# Patient Record
Sex: MALE | Race: WHITE
[De-identification: names, ages, dates, MRNs, and addresses within clinical notes are randomized per-mention and may not be internally consistent; named-entity substitution may affect disease eponyms.]

---

## 2017-05-27 ENCOUNTER — HOSPITAL ENCOUNTER (EMERGENCY)
Dept: HOSPITAL 25 - UCCORT | Age: 52
Discharge: HOME | End: 2017-05-27
Payer: MEDICARE

## 2017-05-27 VITALS — DIASTOLIC BLOOD PRESSURE: 73 MMHG | SYSTOLIC BLOOD PRESSURE: 137 MMHG
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
## 2017-05-27 DIAGNOSIS — S90.31XA: Primary | ICD-10-CM
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
## 2017-05-27 PROCEDURE — 99211 OFF/OP EST MAY X REQ PHY/QHP: CPT

## 2017-05-27 PROCEDURE — G0463 HOSPITAL OUTPT CLINIC VISIT: HCPCS
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
## 2017-05-27 NOTE — RAD
HISTORY: First and second metatarsal pain of the right foot, trauma



COMPARISONS: None



VIEWS: 3, Frontal, lateral, and oblique views of the right foot



FINDINGS:



BONE DENSITY: Normal.

BONES: There is no displaced fracture.    

JOINTS: There is no arthropathy.    

ALIGNMENT: There is no dislocation. 

SOFT TISSUES: Unremarkable.



OTHER FINDINGS: None.



IMPRESSION: 

NO ACUTE OSSEOUS INJURY. IF SYMPTOMS PERSIST, RECOMMEND REPEAT IMAGING.

## 2017-06-02 ENCOUNTER — HOSPITAL ENCOUNTER (EMERGENCY)
Dept: HOSPITAL 25 - UCCORT | Age: 52
Discharge: HOME | End: 2017-06-02
Payer: MEDICARE

## 2017-06-02 VITALS — SYSTOLIC BLOOD PRESSURE: 122 MMHG | DIASTOLIC BLOOD PRESSURE: 83 MMHG

## 2017-06-02 DIAGNOSIS — S40.021A: ICD-10-CM

## 2017-06-02 DIAGNOSIS — K21.9: ICD-10-CM

## 2017-06-02 DIAGNOSIS — F32.9: ICD-10-CM
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
## 2017-06-02 DIAGNOSIS — I10: ICD-10-CM
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
## 2017-06-02 DIAGNOSIS — E03.9: ICD-10-CM
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
## 2017-06-02 DIAGNOSIS — S29.8XXA: ICD-10-CM

## 2017-06-02 DIAGNOSIS — M25.511: Primary | ICD-10-CM

## 2017-06-02 PROCEDURE — 71020: CPT

## 2017-06-02 PROCEDURE — G0463 HOSPITAL OUTPT CLINIC VISIT: HCPCS

## 2017-06-02 PROCEDURE — 99212 OFFICE O/P EST SF 10 MIN: CPT
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
## 2017-06-02 NOTE — RAD
INDICATION:  Right rib cage pain.



TECHNIQUE:  3 views of the right ribs were obtained.



FINDINGS:  There is a faint radiolucent line extending through the inferior aspect of the

posterior lateral right sixth rib possibly representing a nondisplaced fracture. This is

only seen in one view. No other fractures are seen.



IMPRESSION:  POSSIBLE NONDISPLACED FRACTURE OF THE RIGHT POSTEROLATERAL SIXTH RIB.

## 2017-06-02 NOTE — RAD
INDICATION:  Right shoulder pain.



TECHNIQUE: 3 views of the right shoulder were obtained.



FINDINGS:  The bones are in normal alignment. No fracture is seen.  



There is mild osteoarthritic change in the glenohumeral joint.



IMPRESSION:  MILD OSTEOARTHRITIC CHANGE.

## 2017-06-02 NOTE — RAD
INDICATION:  Right rib cage pain.



COMPARISON:  Comparison is made with a prior chest x-ray study from July 22, 2013.



TECHNIQUE: Dual-energy PA  and lateral views of the chest were obtained.



FINDINGS:   The heart is within normal limits in size. Mediastinal and hilar contours

appear within normal limits.



The lungs are underinflated. There is minimal atelectasis at the left lung base. The lungs

are otherwise clear. No pleural effusion or pneumothorax is seen.



There is mild pleural thickening present laterally within the right hemithorax possibly

from a rib fracture. Consider rib detail films for further evaluation. 



IMPRESSION:  MILD RIGHT PLEURAL THICKENING LATERALLY POSSIBLY FROM RIB A FRACTURE CONSIDER

RIB FILMS FOR FURTHER EVALUATION.

## 2017-07-29 ENCOUNTER — HOSPITAL ENCOUNTER (EMERGENCY)
Dept: HOSPITAL 25 - UCCORT | Age: 52
Discharge: HOME | End: 2017-07-29
Payer: MEDICARE

## 2017-07-29 VITALS — DIASTOLIC BLOOD PRESSURE: 75 MMHG | SYSTOLIC BLOOD PRESSURE: 114 MMHG

## 2017-07-29 DIAGNOSIS — F79: ICD-10-CM

## 2017-07-29 DIAGNOSIS — Z04.1: Primary | ICD-10-CM

## 2017-07-29 PROCEDURE — G0463 HOSPITAL OUTPT CLINIC VISIT: HCPCS

## 2017-07-29 PROCEDURE — 99211 OFF/OP EST MAY X REQ PHY/QHP: CPT
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
## 2020-03-03 ENCOUNTER — HOSPITAL ENCOUNTER (EMERGENCY)
Dept: HOSPITAL 25 - UCCORT | Age: 55
Discharge: HOME | End: 2020-03-03
Payer: MEDICARE

## 2020-03-03 VITALS — DIASTOLIC BLOOD PRESSURE: 86 MMHG | SYSTOLIC BLOOD PRESSURE: 154 MMHG
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
## 2020-03-03 DIAGNOSIS — Z99.3: ICD-10-CM

## 2020-03-03 DIAGNOSIS — M81.0: ICD-10-CM

## 2020-03-03 DIAGNOSIS — S20.229A: Primary | ICD-10-CM

## 2020-03-03 PROCEDURE — 99211 OFF/OP EST MAY X REQ PHY/QHP: CPT

## 2020-03-03 PROCEDURE — G0463 HOSPITAL OUTPT CLINIC VISIT: HCPCS

## 2020-03-03 NOTE — XMS REPORT
Continuity of Care Document (CCD)

 Created on:2020



Patient:Gigi Summers

Sex:Male

:1965

External Reference #:[id number]





Demographics







 Address  [address] [place] [address]

 

 Home Phone  [phone]

 

 Preferred Language  en

 

 Marital Status  Not  or 

 

 Baptism Affiliation  Unknown

 

 Race  White

 

 Ethnic Group  Not  or 









Author







 Name  Marlin Pearson, [facility]

 

 Address  [address] [place] [id number]









Care Team Providers







 Name  Role  Phone

 

 Heber Proctor MD - Family  Care Team Information   [phone]
[phone]



 Medicine    









Problems







 Active Problems  Provider  Date

 

 Screening for malignant neoplasm of  Stacy Cooney M.D.  Onset: 2018



 prostate    

 

 Chest pain  Tino Holland M.D.,  Onset: 2015



   [facility]  

 

 Electrocardiogram abnormal  Tino Holland M.D.,  Onset: 2015



   [facility]  







Social History







 Type  Date  Description  Comments

 

 Birth Sex    Unknown  

 

 Tobacco Use  Start: Unknown  Never Smoked Cigarettes  

 

 ETOH Use    Never used alcohol  

 

 Tobacco Use  Start: Unknown End: Unknown  Patient is a former smoker  

 

 Recreational Drug Use    Denies Drug Use  

 

 Smoking Status  Reviewed: 18  Patient is a former smoker  







Allergies, Adverse Reactions, Alerts







 Active Allergies  Reaction  Severity  Comments  Date

 

 Meningococcal Vaccines  VOMITTING AND FEVER      2018









 Inactive Allergies









 NKDA        2015







Medications







 Active Medications  SIG  Qnty  Indications  Ordering Provider  Date

 

 Amiloride HCL  1 by mouth  90tabs    Unknown  



            5mg Tablets  every day        



           

 

 Omeprazole  2 Tabs Via Peg  30caps    Unknown  



         20mg Capsules  bid        



 DR          

 

 Zyprexa  1 by mouth  30tabs    Unknown  



      15mg Tablets  every night        



           

 

 Sertraline HCL  1 by mouth  30tabs    Unknown  



             100mg  every day        



 Tablets          



           

 

 Divalproex Sodium  2 cap po tid      Unknown  



                125mg  Via Gtube        



 Capsule          



           

 

 Synthroid  1 by mouth      Unknown  



        112mcg Tablets  every day vi aG        



   Tube        

 

 Oscal 500/200 D-3  1 tab by mouth      Unknown  



   tid  a day        



 500-400mg-Unit Tablets          



           

 

 Vitamin D3 Super  1 by mouth      Unknown  



 Strength  every day        



       2000Unit          



 Capsules          



           







Immunizations







 Description

 

 No Information Available







Vital Signs







 Date  Vital  Result  Comment

 

 2019  1:14pm  BP Systolic  112 mmHg  









 BP Diastolic  82 mmHg  

 

 Body Temperature  96.0 F  

 

 Heart Rate  66 /min  

 

 Height  66 inches  5'6"

 

 Weight  149.00 lb  

 

 BMI (Body Mass Index)  24.0 kg/m2  

 

 BSA (Body Surface Area)  1.76 m2  

 

 Ideal body weight in kilograms  64 kg  

 

 O2 % BldC Oximetry  97 %  









 2018 11:16am  BP Systolic  110 mmHg  









 BP Diastolic  69 mmHg  

 

 Body Temperature  97.3 F  

 

 Heart Rate  68 /min  

 

 Respiratory Rate  16 /min  

 

 Height  68 inches  5'8"

 

 Weight  145.00 lb  Per paper work/ pt present in wheel chair

 

 Pain Level  0  

 

 BMI (Body Mass Index)  22.0 kg/m2  

 

 BSA (Body Surface Area)  1.78 m2  

 

 Ideal body weight in kilograms  70 kg  

 

 O2 % BldC Oximetry  93 %  







Results







 Test  Acquired Date  Facility  Test  Result  H/L  Range  Note

 

 Basic Metabolic  2019  [facility]  Glucose  120 mg/dL  High    1



 Panel    [address] [place], [place] [id number] [phone]          









 BUN  27 mg/dL  High  7-18  

 

 Creatinine  1.0 mg/dL  Normal  0.6-1.3  

 

 Glom Filtration Rate, Estimate  >60 mL/min    >60  

 

 If African American  >60 mL/min    >60  2

 

 BUN/Creat  27.0 ratio      

 

 Sodium  152 mmol/L  High  136-145  

 

 Potassium  3.9 mmol/L  Normal  3.5-5.1  

 

 Chloride  121 mmol/L  Critical high    

 

 Carbon Dioxide  25 mmol/L  Normal  21-32  

 

 Anion Gap  6 mEq/L  Low  8-16  

 

 Calcium  9.3 mg/dL  Normal  8.5-10.1  









 Basic Metabolic Panel  2019  [facility]  Glucose  110 mg/dL  High    



     [address] [place], [place] [id number] [phone]          









 BUN  25 mg/dL  High  7-18  

 

 Creatinine  1.0 mg/dL  Normal  0.6-1.3  

 

 Glom Filtration Rate, Estimate  >60 mL/min    >60  

 

 If African American  >60 mL/min    >60  3

 

 BUN/Creat  25.0 ratio      

 

 Sodium  151 mmol/L  High  136-145  

 

 Potassium  4.0 mmol/L  Normal  3.5-5.1  

 

 Chloride  121 mmol/L  Critical high    

 

 Carbon Dioxide  24 mmol/L  Normal  21-32  

 

 Anion Gap  6 mEq/L  Low  8-16  

 

 Calcium  9.0 mg/dL  Normal  8.5-10.1  









 CBC W/Automated  2019  [facility]  White Blood  9.1 K/uL  Normal  3.4-10.5  



 Diff    [address]  Count        



     [place] [address] [phone]          









 Red Blood Count  3.29 M/uL  Low  4.20-5.80  

 

 Hemoglobin  11.9 gm/dL  Low  12.8-17.0  

 

 Hematocrit  34.5 %  Low  38.0-48.0  

 

 Mean Cell Volume  104.9 fl  High  80.0-96.0  

 

 Mean Corpuscular HGB  36.2 pg  High  27.0-33.0  

 

 Mean Corpuscular HGB Conc  34.5 g/dL  Normal  31.7-36.0  

 

 Platelet Count  67 K/uL  Low  155-360  

 

 Red Cell Distri Width SD  49.6 fl  Normal  36-51  

 

 Red Cell Distri Width %CV  13.0 %  Normal  11.6-15.8  

 

 Mean Platelet Volume  13.7 fl  High  6.6-10.6  

 

 Neut%  83.6 %  High  33.0-73.0  

 

 Lymph %  9.4 %  Low  20.0-42.0  

 

 Mono %  6.3 %  Normal  0.0-10.0  

 

 Eo%  0.0 %  Normal  0.0-6.6  

 

 Bas%  0.2 %  Normal  0.0-1.1  

 

 Immature Grans  0.5 %  Normal  0.0-5.0  

 

 NRBC %  0.0 /100WBC    < 10/ 100 WBC  

 

 Neut#  7.61 K/uL  High  1.8-7.0  

 

 Lymph #  0.86 K/uL  Low  1.0-4.0  

 

 Mono #  0.57 K/uL  Normal  0.0-0.8  

 

 Eos #  0.00 K/uL  Normal  0.0-0.5  

 

 Baso #  0.02 K/uL  Normal  0.0-0.1  

 

 Immature Grans Absolute  0.05 K/uL      

 

 NRBC #  0.00 K/uL      









 Lactic Acid  2019  [facility]  Lactic Acid  1.4 mmol/L  Normal  0.4-1.9  



     [address]          



     [place] [address] [phone]          









 Lab Reflex >2.0 for Sepsis?  N      









 Basic Metabolic Panel  2019  [facility]  Glucose  108 mg/dL  High    



     [address] [place], [place] [id number] [phone]          









 BUN  25 mg/dL  High  7-18  

 

 Creatinine  0.9 mg/dL  Normal  0.6-1.3  

 

 Glom Filtration Rate, Estimate  >60 mL/min    >60  

 

 If African American  >60 mL/min    >60  4

 

 BUN/Creat  27.7 ratio      

 

 Sodium  151 mmol/L  High  136-145  

 

 Potassium  4.4 mmol/L    3.5-5.1  

 

 Chloride  122 mmol/L  Critical high    

 

 Carbon Dioxide  25 mmol/L  Normal  21-32  

 

 Anion Gap  4 mEq/L  Low  8-16  

 

 Calcium  8.3 mg/dL  Low  8.5-10.1  









 Lactic Acid  2019  [facility]  Lactic Acid  2.7 mmol/L  Critical  0.4-1.9  



     [address] [place] [address] [phone]          









 Lab Reflex >2.0 for Sepsis?  N      









 Lactic Acid  2019  [facility]  Lactic Acid  2.9 mmol/L  Critical  0.4-1.9  



     [address] [place] [address] [phone]          









 Lab Reflex >2.0 for Sepsis?  Y      









 Laboratory  2019  [facility]  Lactic  2.5 mmol/L  Critical  0.4-1.9  



 test finding    [address] [place] [address] [phone]          

 

 Ua RFX Micro &  2019  [facility]  Urine  YELLOW    Yellow  



 Culture II    [address] [place] [address] [phone]          









 Urine Clarity  CLEAR    Clear  

 

 Urine Glucose - Dipstick  NEGATIVE mg/dL    Negative  

 

 Urine Bilirubin - Dipstick  NEGATIVE    Negative  

 

 Urine Ketone  NEGATIVE mg/dL    Negative  

 

 Urine Specific Gravity  1.010  Normal  1.010-1.030  

 

 Urine Blood  NEGATIVE    0-2  

 

 Urine PH  7.5  Normal  6.5-7.5  

 

 Urine Protein - Dipstick  NEGATIVE mg/dL    Negative  

 

 Urine Urobilinogen - Dipstick  0.2 E.U./dL  Normal  0.2-1.0  

 

 Urine Nitrite - Dipstick  NEGATIVE    Negative  

 

 Urine Leuk Esterase  NEGATIVE    Negative  

 

 Source:  URINE, CLEAN CAT <SEE NOTE>      5









 Comprehensive  2019  [facility]  Glucose  86 mg/dL  Normal    



 Metabolic Panel    [address]          



     [place] [address] [phone]          









 BUN  36 mg/dL  High  7-18  

 

 Creatinine  1.2 mg/dL  Normal  0.6-1.3  

 

 Glom Filtration Rate, Estimate  >60 mL/min    >60  

 

 If African American  >60 mL/min    >60  6

 

 BUN/Creat  30.0 ratio      

 

 Sodium  141 mmol/L  Normal  136-145  

 

 Potassium  3.6 mmol/L  Normal  3.5-5.1  

 

 Chloride  108 mmol/L  High    

 

 Carbon Dioxide  24 mmol/L  Normal  21-32  

 

 Anion Gap  9 mEq/L  Normal  8-16  

 

 Calcium  9.1 mg/dL  Normal  8.5-10.1  

 

 Total Protein  6.6 g/dL  Normal  6.4-8.2  

 

 Albumin  2.9 g/dL  Low  3.4-5.0  

 

 Globulin  3.7 g/dL  Normal  1.9-4.3  

 

 Alb/Glob  0.8 ratio      

 

 Bilirubin,Total  0.9 mg/dL  Normal  0.2-1.0  

 

 Sgot/Ast  29 U/L  Normal  15-37  

 

 SGPT/Alt  37 U/L  Normal  12-78  

 

 Alkaline Phosphatase  121 U/L  High    









 Blood Culture  2019  [facility]  Blood Culture  NO GROWTH: FINAL      7



     [address]  Aerobic  <SEE NOTE>      



     [place] [address] [phone]          









 Blood Culture Anaerobic  NO GROWTH: FINAL <SEE NOTE>      8









 Blood Culture  2019  [facility]  Blood Culture  NO GROWTH: FINAL      9



     [address]  Aerobic  <SEE NOTE>      



     [place] [address] [phone]          









 Blood Culture Anaerobic  NO GROWTH: FINAL <SEE NOTE>      10









 1  SEPSIS / PNEUMONIA

 

 2  Note:



   Persistent reduction for 3 months or more in an eGFR <60



   mL/min/1.73 m2 defines CKD.  Patients with eGFR values >/=60



   mL/min/1.73 m2 may also have CKD if evidence of persistent



   proteinuria is present.



   



   The original MDRD equation for estimated GFR is not valid



   for patients less than 18 years of age.



   



   Additional information may be found at www.kdoqi.org.

 

 3  Note:



   Persistent reduction for 3 months or more in an eGFR <60



   mL/min/1.73 m2 defines CKD.  Patients with eGFR values >/=60



   mL/min/1.73 m2 may also have CKD if evidence of persistent



   proteinuria is present.



   



   The original MDRD equation for estimated GFR is not valid



   for patients less than 18 years of age.



   



   Additional information may be found at www.kdoqi.org.

 

 4  Note:



   Persistent reduction for 3 months or more in an eGFR <60



   mL/min/1.73 m2 defines CKD.  Patients with eGFR values >/=60



   mL/min/1.73 m2 may also have CKD if evidence of persistent



   proteinuria is present.



   



   The original MDRD equation for estimated GFR is not valid



   for patients less than 18 years of age.



   



   Additional information may be found at www.kdoqi.org.

 

 5  URINE, CLEAN CATCH

 

 6  Note:



   Persistent reduction for 3 months or more in an eGFR <60



   mL/min/1.73 m2 defines CKD.  Patients with eGFR values >/=60



   mL/min/1.73 m2 may also have CKD if evidence of persistent



   proteinuria is present.



   



   The original MDRD equation for estimated GFR is not valid



   for patients less than 18 years of age.



   



   Additional information may be found at www.kdoqi.org.

 

 7  NO GROWTH: FINAL REPORT

 

 8  NO GROWTH: FINAL REPORT

 

 9  NO GROWTH: FINAL REPORT

 

 10  NO GROWTH: FINAL REPORT







Procedures







 Date  Code  Description  Status

 

 10/30/2019  27004  EKG Interpretation And Report Only  Completed







[employer]







 Description

 

 No Information Available







Encounters







 Type  Date  Location  Provider  Dx  Diagnosis

 

 Office Visit  2019  [facility]  Marlin Pearson  M25.561  Pain 
in right



   1:00p    S., [facility]    knee







Assessments







 Date  Code  Description  Provider

 

 2019  M25.561  Pain in right knee  Marlin Pearson, [facility]

 

 10/30/2019  F63.81  Intermittent explosive disorder  Tino Holland M.D.,



       [facility]

 

 2019  J96.01  Acute respiratory failure with  Aletha Wise MD



     hypoxia  

 

 2019  J18.1  Lobar pneumonia, unspecified organism  Aletha Wise MD

 

 2019  A41.9  Sepsis, unspecified organism  Aletha Wise MD

 

 2019  E87.0  Hyperosmolality and hypernatremia  Aletha Wise MD

 

 2019  J96.01  Acute respiratory failure with  Flako Lyn FNP



     hypoxia  

 

 2019  J18.1  Lobar pneumonia, unspecified organism  Flako Lyn FNP

 

 2019  A41.9  Sepsis, unspecified organism  Flako Lyn FNP

 

 2019  E87.0  Hyperosmolality and hypernatremia  Flako Lyn FNP

 

 2019  J96.01  Acute respiratory failure with  Flako Lyn FNP



     hypoxia  

 

 2019  J18.1  Lobar pneumonia, unspecified organism  Flako Lyn FNP

 

 2019  A41.9  Sepsis, unspecified organism  Flako Lyn FN

 

 2019  Z87.01  Personal history of pneumonia  Flako Lyn FNP



     (recurrent)  







Plan of Treatment

No Information Available



Functional Status







 Functional Condition  Comment  Date  Status

 

 Peg Tube      Active

 

 Dependent with all ADL's      Active







Mental Status







 Description

 

 No Information Available







Referrals







 Description

 

 No Information Available

## 2020-03-03 NOTE — XMS REPORT
Continuity of Care Document (CCD)

 Created on:2020



Patient:Gigi Summers

Sex:Male

:1965

External Reference #:[id number]





Demographics







 Address  [address] [place] [address]

 

 Home Phone  [phone]

 

 Preferred Language  en

 

 Marital Status  Not  or 

 

 Buddhism Affiliation  Unknown

 

 Race  White

 

 Ethnic Group  Not  or 









Author







 Name  Franco Ruvalcaba M.D. (transmitted by agent of provider Trinidad Roberson)

 

 Address  [address] [place] [address]









Care Team Providers







 Name  Role  Phone

 

 Heber Proctor MD - Family  Care Team Information   [phone]
[phone]



 Medicine    









Problems







 Active Problems  Provider  Date

 

 Impacted sherryn  Franco Ruvalcaba M.D.  Onset: 2011

 

 Deviated nasal septum  Franco Ruvalcaba M.D.  Onset: 2011







Social History







 Type  Date  Description  Comments

 

 Birth Sex    Unknown  

 

 Tobacco Use  Start: Unknown  Never Smoked Cigarettes  

 

 ETOH Use    Never used alcohol  

 

 Tobacco Use  Start: Unknown  Patient has never smoked  

 

 Recreational Drug Use    Never Used Drugs  







Allergies, Adverse Reactions, Alerts







 Active Allergies  Reaction  Severity  Comments  Date

 

 Menglytate        2019









 Inactive Allergies









 NKDA        2007







Medications







 Active Medications  SIG  Qnty  Indications  Ordering  Date



         Provider  

 

 Acetic Acid  4 drops in right  1units    Franco Ruvalcaba,  2019



           2% Solution  ear twice a day      M.D.  



   for 7 days        

 

 Baby Oil And H2o2  3 drops baby oil  1units    Franco Ruvalcaba,  2019



   both ears for 3      M.D.  



   nights then 3        



   drops h2o2 both        



   ears 1 night        



   continue to        



   alternate until        



   audio evaluation        

 

 Baby Oil  3 drops both ears      Franco Ruvalcaba,  2019



   for 3 days and      M.D.  



   then h2o2  3        



   drops for the 4th        



   day and repeat        



   for 3 weeks        

 

 Hydrogen Peroxide  3 drops both ears      Franco Ruvalcaba,  2019



   on 4 th day and      M.D.  



   repeat cycle with        



   baby oil        

 

 Baby  Apply 3 Drops In  591units    Franco Ruvalcaba,  10/15/2018



    20Oz. Oil 20 Oz.  Both Ears AT      M.D.  



   Night Once A Week        



   ( Saturday )        

 

 Baby Oil  apply 3 drops at  1Bottle    Franco Ruvalcaba,  10/16/2017



         Oil  night twice a      M.D.  



   week and then        



   nightly week        



   prior to ear        



   cleaning.        

 

 Levothyroxine Sodium  1 by mouth every      Unknown  



   day        



 112mcg Tablets          



           

 

 Divalproex Sodium ER  2 tab daily at      Unknown  



   noon        



 250mg Tablets ER 24HR          



           

 

 Alendronate Sodium  take 1 tablet by      Unknown  



                  70mg  mouth weekly        



 Tablets          



           

 

 Hydrocortisone  apply to affected      Unknown  



              1% Cream  twice a day        



           

 

 Twocal HN        Unknown  



          Liquid          



           

 

 Sertraline HCL        Unknown  



               Tablets          



           

 

 Resource Thickenup        Unknown  



           



 Powder          



           

 

 Omeprazole  1 by mouth every      Unknown  



          20mg  day        



 Capsules DR          



           

 

 Olanzapine        Unknown  



          10mg Tablets          



           

 

 Metoclopramide HCL        Unknown  



           



 5mg/5ML Solution          



           

 

 Amiloride HCL        Unknown  



             5mg          



 Tablets          



           

 

 Mylanta  15cc via g-tube      Unknown  



        Suspension  as needed        



           

 

 Milk Of Magnesia  1 tbls via g-tube      Unknown  



   daily as needed        



 400mg/5ML Suspension          



           

 

 Acetaminophen  650mg via g-tube      Unknown  



             160mg/5ML  prn        



 Liquid          



           

 

 Vitamin D3 Super  daily via g-tube      Unknown  



 Strength          



        2000Unit          



 Capsules          



           







Immunizations







 Description

 

 No Information Available







Vital Signs







 Date  Vital  Result  Comment

 

 2020  9:14am  Weight  150.00 lb  









 Height  67 inches  5'7"

 

 BMI (Body Mass Index)  23.5 kg/m2  

 

 BP Systolic  166 mmHg  

 

 BP Diastolic  124 mmHg  

 

 Heart Rate  83 /min  

 

 O2 % BldC Oximetry  95 %  

 

 Body Temperature  97.2 F  

 

 Pain Level  0  









 2019 11:14am  Weight  144.00 lb  









 Height  67 inches  5'7"

 

 BMI (Body Mass Index)  22.6 kg/m2  

 

 BP Systolic  134 mmHg  

 

 BP Diastolic  68 mmHg  

 

 Heart Rate  76 /min  

 

 O2 % BldC Oximetry  97 %  

 

 Body Temperature  98.0 F  

 

 Pain Level  0  







Results







 Description

 

 No Information Available







Procedures







 Date  Code  Description  Status

 

 2019  71599  Tympanometry  Completed

 

 2019  [address]206  Pure Tone Audiometry, Air  Completed







[employer] Devices







 Description

 

 No Information Available







Encounters







 Type  Date  Location  Provider  Dx  Diagnosis

 

 Office Visit  2019  Main Office  Radha Francis,  B37.84  Candidal 
otitis



   11:00a    NP    externa

 

 Office Visit  2019  Main Office  Radha Francis,  H61.23  Impacted 
cerumen,



   9:30a    NP    bilateral









 B37.84  Candidal otitis externa

 

 H91.90  Unspecified hearing loss, unspecified ear







Assessments







 Date  Code  Description  Provider

 

 2019  B37.84  Candidal otitis externa  Radha Francis, WALKER

 

 2019  H61.23  Impacted cerumen, bilateral  Radha Francis, WALKER

 

 2019  B37.84  Candidal otitis externa  Radha Francis, NP

 

 2019  H91.90  Unspecified hearing loss, unspecified ear  Radha Francis NP







Plan of Treatment

2019 - Radha Francis, NPH61.23 Impacted cerumen, ldjymbmmfD46.84 
Candidal otitis uaroigxB08.90 Unspecified hearing loss, unspecified ear



Functional Status







 Functional Condition  Comment  Date  Status

 

 Rolling walker is used to ambulate      Active







Mental Status







 Description

 

 No Information Available







Referrals







 Description

 

 No Information Available

## 2020-03-03 NOTE — UC
Minor Trauma HPI





- HPI Summary


HPI Summary: 


56 yo male in group home injured his thoracic back when he feel against a 

dresser


when questioned he denies pain


he is eager to go to work tomorrow





hx osteoporosis


in a wheel chair





he has been acting his normal self since fall











- History of Current Complaint


Chief Complaint: UCBackPain


Stated Complaint: PT FELL  BACK PAIN


Time Seen by Provider: 03/03/20 21:56


Hx Obtained From: Patient, Family/Caretaker, Medical Records


Onset/Duration: Sudden Onset, Lasting Minutes


Onset Of Pain: Immediate


Severity Initially: Mild


Severity Currently: None


Pain Intensity: 0


Pain Scale Used: 0-10 Numeric


Mechanism Of Injury: Fall From A Standing Position


Aggravating Factor(s): Nothing


Alleviating Factor(s): Nothing


Associated Signs And Symptoms: Negative: Loss Of Consciousness, Ecchymosis, 

Swelling


Related History: Negative: Similar Episode


Body - Head: 


  __________________________














  __________________________





 1 - no midline keyanna tenderness with palpation/no chest pain with rib 

compression








- Allergies/Home Medications


Allergies/Adverse Reactions: 


 Allergies











Allergy/AdvReac Type Severity Reaction Status Date / Time


 


No Known Allergies Allergy   Verified 03/03/20 21:49











Home Medications: 


 Home Medications





Alendronate Sodium [Fosamax-] 70 mg PO WEEKLY 11/08/17 [History Confirmed 11/08/ 17]


Calcium [Oyster-Jesus 500] 500 mg PO 11/08/17 [History]


Cholecalciferol (Vitamin D3) [D3-1000] 1,000 unit PO 11/08/17 [History]


Divalproex Sprinkle CAP* [Depakote Sprinkle CAP*] 250 mg PO 11/08/17 [History]


Levothyroxine TAB* [Synthroid TAB*] 100 mcg PO DAILY 11/08/17 [History 

Confirmed 11/08/17]


Metoclopramide LIQ* [Reglan LIQ*] 5 mg PO QID 11/08/17 [History Confirmed 11/08/ 17]


Nutritional Supplements [Thrivacin 30] 1 liq PO 11/08/17 [History]


Omeprazole CAP (NF) [Prilosec CAP* 20 MG] 20 mg PO DAILY 11/08/17 [History 

Confirmed 11/08/17]


Sertraline* [Zoloft*] 150 mg PO DAILY 11/08/17 [History Confirmed 11/08/17]


aMILoride TAB* [Midamor TAB*] 5 mg PO DAILY 11/08/17 [History Confirmed 11/08/17

]











PMH/Surg Hx/FS Hx/Imm Hx


Previously Healthy: Yes - MR





- Surgical History


Surgical History: Yes


Surgery Procedure, Year, and Place: g tube, PEG tube; tonsilectomy as child;





- Family History


Known Family History: Positive: Unknown


Family History: patient unaware of FH





- Social History


Alcohol Use: None


Substance Use Type: None


Smoking Status (MU): Never Smoked Tobacco





- Immunization History


Most Recent Tetanus Shot: utd





Review of Systems


All Other Systems Reviewed And Are Negative: Yes


Constitutional: Positive: Negative


Skin: Positive: Negative


Eyes: Positive: Negative


ENT: Positive: Negative


Respiratory: Positive: Negative


Cardiovascular: Positive: Negative


Gastrointestinal: Positive: Negative


Genitourinary: Positive: Negative


Motor: Positive: Negative


Neurovascular: Positive: Negative


Musculoskeletal: Positive: Negative


Neurological/Mental Status: Positive: Negative


Psychological: Positive: Negative





Physical Exam


Triage Information Reviewed: Yes


Appearance: Well-Appearing, No Pain Distress


Vital Signs: 


 Initial Vital Signs











Temp  97.6 F   03/03/20 21:47


 


Pulse  70   03/03/20 21:47


 


Resp  16   03/03/20 21:47


 


BP  154/86   03/03/20 21:47


 


Pulse Ox  98   03/03/20 21:47











Vital Signs Reviewed: Yes


Eyes: Positive: Conjunctiva Clear


ENT: Negative: Nasal congestion, Nasal drainage, Muffled voice, Hoarse voice


Dental: Positive: Other: - poor dentition


Respiratory: Positive: Chest non-tender, No respiratory distress, No accessory 

muscle use, Rhonchi


Cardiovascular: Positive: RRR


Musculoskeletal: Positive: Other: - able to reach overhead and forward/strong 

,  some contractures


Neurological: Positive: Alert


Psychological Exam: Normal


Skin Exam: Other - faint contusion thoracic back





Minor Trauma Course/Dx





- Differential Dx/Diagnosis


Provider Diagnosis: 


 Back contusion








Discharge ED





- Sign-Out/Discharge


Documenting (check all that apply): Patient Departure


All imaging exams completed and their final reports reviewed: No Studies





- Discharge Plan


Condition: Stable


Disposition: HOME


Patient Education Materials:  Contusion in Adults (ED)


Referrals: 


Proctor,Heber, MD [Primary Care Provider] - 1 Week (if not better)





- Billing Disposition and Condition


Condition: STABLE


Disposition: Home